# Patient Record
Sex: FEMALE | Race: WHITE | NOT HISPANIC OR LATINO | URBAN - METROPOLITAN AREA
[De-identification: names, ages, dates, MRNs, and addresses within clinical notes are randomized per-mention and may not be internally consistent; named-entity substitution may affect disease eponyms.]

---

## 2019-03-05 ENCOUNTER — OFFICE VISIT (OUTPATIENT)
Dept: URBAN - METROPOLITAN AREA CLINIC 76 | Facility: CLINIC | Age: 24
End: 2019-03-05

## 2019-03-05 DIAGNOSIS — H52.13 MYOPIA, BILATERAL: Primary | ICD-10-CM

## 2019-03-05 PROCEDURE — 92014 COMPRE OPH EXAM EST PT 1/>: CPT | Performed by: OPTOMETRIST

## 2019-03-05 PROCEDURE — 92310 CONTACT LENS FITTING OU: CPT | Performed by: OPTOMETRIST

## 2019-03-05 ASSESSMENT — VISUAL ACUITY
OD: 20/20
OS: 20/20

## 2019-03-05 ASSESSMENT — INTRAOCULAR PRESSURE
OS: 17
OD: 17

## 2019-03-05 ASSESSMENT — KERATOMETRY
OS: 42.63
OD: 42.25

## 2019-03-05 NOTE — IMPRESSION/PLAN
Impression: Myopia, bilateral: H52.13. OU. Plan: Discussed diagnosis with patient. Dispensed new MRx today. Switch to monthly lenses. Order trial ctls trials. If pt happy with comfort and vision w/ ctls, ok to finalize. Pt to call with any concerns.